# Patient Record
Sex: FEMALE | Race: BLACK OR AFRICAN AMERICAN | NOT HISPANIC OR LATINO | URBAN - METROPOLITAN AREA
[De-identification: names, ages, dates, MRNs, and addresses within clinical notes are randomized per-mention and may not be internally consistent; named-entity substitution may affect disease eponyms.]

---

## 2021-10-19 ENCOUNTER — APPOINTMENT (RX ONLY)
Dept: URBAN - METROPOLITAN AREA CLINIC 50 | Facility: CLINIC | Age: 61
Setting detail: DERMATOLOGY
End: 2021-10-19

## 2021-10-19 DIAGNOSIS — L30.5 PITYRIASIS ALBA: ICD-10-CM

## 2021-10-19 PROCEDURE — ? PRESCRIPTION

## 2021-10-19 PROCEDURE — 99203 OFFICE O/P NEW LOW 30 MIN: CPT

## 2021-10-19 PROCEDURE — ? COUNSELING

## 2021-10-19 PROCEDURE — ? TREATMENT REGIMEN

## 2021-10-19 RX ORDER — HYDROCORTISONE 25 MG/G
THIN LAYER OINTMENT TOPICAL BID
Qty: 28.35 | Refills: 2 | Status: ERX | COMMUNITY
Start: 2021-10-19

## 2021-10-19 RX ADMIN — HYDROCORTISONE THIN LAYER: 25 OINTMENT TOPICAL at 00:00

## 2021-10-19 ASSESSMENT — LOCATION SIMPLE DESCRIPTION DERM
LOCATION SIMPLE: LEFT CHEEK
LOCATION SIMPLE: RIGHT CHEEK

## 2021-10-19 ASSESSMENT — LOCATION ZONE DERM: LOCATION ZONE: FACE

## 2021-10-19 ASSESSMENT — LOCATION DETAILED DESCRIPTION DERM
LOCATION DETAILED: LEFT MEDIAL MALAR CHEEK
LOCATION DETAILED: RIGHT MEDIAL BUCCAL CHEEK

## 2021-10-19 NOTE — PROCEDURE: TREATMENT REGIMEN
Initiate Treatment: Hydrocortisone 2.5 % topical ointment Bid \\nSig: Apply thin layer to affect area on face twice a day for two weeks, stop a week, then continue as needed.
Detail Level: Zone

## 2021-10-19 NOTE — HPI: DISCOLORATION
How Severe Is Your Skin Discoloration?: moderate
Additional History: Patient presents today for discoloration on her face. Patient states discoloration has been going on for several years. Patient has only put moisturizer on her face. No history of skin cancer. Patient states areas of discoloration can be dry.